# Patient Record
Sex: MALE | Race: OTHER | Employment: FULL TIME | URBAN - METROPOLITAN AREA
[De-identification: names, ages, dates, MRNs, and addresses within clinical notes are randomized per-mention and may not be internally consistent; named-entity substitution may affect disease eponyms.]

---

## 2019-11-02 ENCOUNTER — LAB ENCOUNTER (OUTPATIENT)
Dept: LAB | Age: 54
End: 2019-11-02
Attending: ORTHOPAEDIC SURGERY
Payer: COMMERCIAL

## 2019-11-02 DIAGNOSIS — M17.11 PRIMARY OSTEOARTHRITIS OF RIGHT KNEE: ICD-10-CM

## 2019-11-02 PROCEDURE — 86901 BLOOD TYPING SEROLOGIC RH(D): CPT

## 2019-11-02 PROCEDURE — 86900 BLOOD TYPING SEROLOGIC ABO: CPT

## 2019-11-02 PROCEDURE — 86850 RBC ANTIBODY SCREEN: CPT

## 2019-11-02 PROCEDURE — 87081 CULTURE SCREEN ONLY: CPT

## 2019-11-02 PROCEDURE — 36415 COLL VENOUS BLD VENIPUNCTURE: CPT

## 2019-11-08 ENCOUNTER — LAB ENCOUNTER (OUTPATIENT)
Dept: LAB | Facility: HOSPITAL | Age: 54
End: 2019-11-08
Attending: ORTHOPAEDIC SURGERY
Payer: COMMERCIAL

## 2019-11-08 DIAGNOSIS — M17.11 PRIMARY OSTEOARTHRITIS OF RIGHT KNEE: ICD-10-CM

## 2019-11-08 PROCEDURE — 36415 COLL VENOUS BLD VENIPUNCTURE: CPT

## 2019-11-08 PROCEDURE — 86850 RBC ANTIBODY SCREEN: CPT

## 2019-11-08 PROCEDURE — 86901 BLOOD TYPING SEROLOGIC RH(D): CPT

## 2019-11-08 PROCEDURE — 86900 BLOOD TYPING SEROLOGIC ABO: CPT

## 2019-11-25 ENCOUNTER — ANESTHESIA EVENT (OUTPATIENT)
Dept: SURGERY | Facility: HOSPITAL | Age: 54
DRG: 470 | End: 2019-11-25
Payer: COMMERCIAL

## 2019-11-25 ENCOUNTER — ANESTHESIA (OUTPATIENT)
Dept: SURGERY | Facility: HOSPITAL | Age: 54
DRG: 470 | End: 2019-11-25
Payer: COMMERCIAL

## 2019-11-25 ENCOUNTER — APPOINTMENT (OUTPATIENT)
Dept: GENERAL RADIOLOGY | Facility: HOSPITAL | Age: 54
DRG: 470 | End: 2019-11-25
Attending: ORTHOPAEDIC SURGERY
Payer: COMMERCIAL

## 2019-11-25 ENCOUNTER — HOSPITAL ENCOUNTER (INPATIENT)
Facility: HOSPITAL | Age: 54
LOS: 1 days | Discharge: HOME HEALTH | DRG: 470 | End: 2019-11-26
Attending: ORTHOPAEDIC SURGERY | Admitting: ORTHOPAEDIC SURGERY
Payer: COMMERCIAL

## 2019-11-25 DIAGNOSIS — M17.11 PRIMARY OSTEOARTHRITIS OF RIGHT KNEE: Primary | ICD-10-CM

## 2019-11-25 PROCEDURE — 73560 X-RAY EXAM OF KNEE 1 OR 2: CPT | Performed by: ORTHOPAEDIC SURGERY

## 2019-11-25 PROCEDURE — 3E0T3BZ INTRODUCTION OF ANESTHETIC AGENT INTO PERIPHERAL NERVES AND PLEXI, PERCUTANEOUS APPROACH: ICD-10-PCS | Performed by: ANESTHESIOLOGY

## 2019-11-25 PROCEDURE — 76942 ECHO GUIDE FOR BIOPSY: CPT | Performed by: ORTHOPAEDIC SURGERY

## 2019-11-25 PROCEDURE — 88311 DECALCIFY TISSUE: CPT | Performed by: ORTHOPAEDIC SURGERY

## 2019-11-25 PROCEDURE — 88305 TISSUE EXAM BY PATHOLOGIST: CPT | Performed by: ORTHOPAEDIC SURGERY

## 2019-11-25 PROCEDURE — 0SRC0J9 REPLACEMENT OF RIGHT KNEE JOINT WITH SYNTHETIC SUBSTITUTE, CEMENTED, OPEN APPROACH: ICD-10-PCS | Performed by: ORTHOPAEDIC SURGERY

## 2019-11-25 DEVICE — ATTUNE KNEE SYSTEM FEMORAL POSTERIOR STABILIZED SIZE 6 RIGHT CEMENTED
Type: IMPLANTABLE DEVICE | Site: KNEE | Status: FUNCTIONAL
Brand: ATTUNE

## 2019-11-25 DEVICE — ATTUNE PATELLA MEDIALIZED ANATOMIC 38MM CEMENTED AOX
Type: IMPLANTABLE DEVICE | Site: KNEE | Status: FUNCTIONAL
Brand: ATTUNE

## 2019-11-25 DEVICE — CEMENT BONE RADIOPAQ HOWMEDICA: Type: IMPLANTABLE DEVICE | Site: KNEE | Status: FUNCTIONAL

## 2019-11-25 DEVICE — ATTUNE KNEE SYSTEM TIBIAL BASE ROTATING PLATFORM SIZE 5 CEMENTED
Type: IMPLANTABLE DEVICE | Site: KNEE | Status: FUNCTIONAL
Brand: ATTUNE

## 2019-11-25 DEVICE — ATTUNE KNEE SYSTEM TIBIAL INSERT ROTATING PLATFORM POSTERIOR STABILIZED 6 6MM AOX
Type: IMPLANTABLE DEVICE | Site: KNEE | Status: FUNCTIONAL
Brand: ATTUNE

## 2019-11-25 RX ORDER — BISACODYL 10 MG
10 SUPPOSITORY, RECTAL RECTAL
Status: DISCONTINUED | OUTPATIENT
Start: 2019-11-25 | End: 2019-11-26

## 2019-11-25 RX ORDER — CEFAZOLIN SODIUM/WATER 2 G/20 ML
2 SYRINGE (ML) INTRAVENOUS EVERY 8 HOURS
Status: COMPLETED | OUTPATIENT
Start: 2019-11-25 | End: 2019-11-26

## 2019-11-25 RX ORDER — HYDROMORPHONE HYDROCHLORIDE 1 MG/ML
0.4 INJECTION, SOLUTION INTRAMUSCULAR; INTRAVENOUS; SUBCUTANEOUS EVERY 2 HOUR PRN
Status: DISCONTINUED | OUTPATIENT
Start: 2019-11-25 | End: 2019-11-26

## 2019-11-25 RX ORDER — SODIUM CHLORIDE 9 MG/ML
INJECTION, SOLUTION INTRAVENOUS CONTINUOUS
Status: DISCONTINUED | OUTPATIENT
Start: 2019-11-25 | End: 2019-11-26

## 2019-11-25 RX ORDER — DIPHENHYDRAMINE HYDROCHLORIDE 50 MG/ML
25 INJECTION INTRAMUSCULAR; INTRAVENOUS ONCE AS NEEDED
Status: ACTIVE | OUTPATIENT
Start: 2019-11-25 | End: 2019-11-25

## 2019-11-25 RX ORDER — OXYCODONE HYDROCHLORIDE 15 MG/1
15 TABLET ORAL EVERY 4 HOURS PRN
Status: DISCONTINUED | OUTPATIENT
Start: 2019-11-25 | End: 2019-11-26

## 2019-11-25 RX ORDER — ACETAMINOPHEN 325 MG/1
650 TABLET ORAL ONCE
Status: COMPLETED | OUTPATIENT
Start: 2019-11-25 | End: 2019-11-25

## 2019-11-25 RX ORDER — MELATONIN
325
Status: DISCONTINUED | OUTPATIENT
Start: 2019-11-26 | End: 2019-11-26

## 2019-11-25 RX ORDER — ONDANSETRON 2 MG/ML
4 INJECTION INTRAMUSCULAR; INTRAVENOUS EVERY 4 HOURS PRN
Status: DISCONTINUED | OUTPATIENT
Start: 2019-11-25 | End: 2019-11-26

## 2019-11-25 RX ORDER — SENNOSIDES 8.6 MG
17.2 TABLET ORAL NIGHTLY
Status: DISCONTINUED | OUTPATIENT
Start: 2019-11-25 | End: 2019-11-26

## 2019-11-25 RX ORDER — KETOROLAC TROMETHAMINE 30 MG/ML
30 INJECTION, SOLUTION INTRAMUSCULAR; INTRAVENOUS EVERY 6 HOURS
Status: COMPLETED | OUTPATIENT
Start: 2019-11-25 | End: 2019-11-26

## 2019-11-25 RX ORDER — POLYETHYLENE GLYCOL 3350 17 G/17G
17 POWDER, FOR SOLUTION ORAL DAILY PRN
Status: DISCONTINUED | OUTPATIENT
Start: 2019-11-25 | End: 2019-11-26

## 2019-11-25 RX ORDER — OXYCODONE HYDROCHLORIDE 5 MG/1
5 TABLET ORAL EVERY 4 HOURS PRN
Status: DISCONTINUED | OUTPATIENT
Start: 2019-11-25 | End: 2019-11-26

## 2019-11-25 RX ORDER — DIPHENHYDRAMINE HCL 25 MG
25 CAPSULE ORAL EVERY 4 HOURS PRN
Status: DISCONTINUED | OUTPATIENT
Start: 2019-11-25 | End: 2019-11-26

## 2019-11-25 RX ORDER — MEPERIDINE HYDROCHLORIDE 25 MG/ML
12.5 INJECTION INTRAMUSCULAR; INTRAVENOUS; SUBCUTANEOUS AS NEEDED
Status: DISCONTINUED | OUTPATIENT
Start: 2019-11-25 | End: 2019-11-25 | Stop reason: HOSPADM

## 2019-11-25 RX ORDER — ACETAMINOPHEN 325 MG/1
650 TABLET ORAL 4 TIMES DAILY
Status: DISCONTINUED | OUTPATIENT
Start: 2019-11-25 | End: 2019-11-26

## 2019-11-25 RX ORDER — ACETAMINOPHEN 500 MG
1000 TABLET ORAL ONCE
Status: DISCONTINUED | OUTPATIENT
Start: 2019-11-25 | End: 2019-11-25 | Stop reason: HOSPADM

## 2019-11-25 RX ORDER — ACETAMINOPHEN 500 MG
1000 TABLET ORAL 3 TIMES DAILY
Qty: 120 TABLET | Refills: 0 | Status: SHIPPED | OUTPATIENT
Start: 2019-11-25

## 2019-11-25 RX ORDER — SODIUM CHLORIDE, SODIUM LACTATE, POTASSIUM CHLORIDE, CALCIUM CHLORIDE 600; 310; 30; 20 MG/100ML; MG/100ML; MG/100ML; MG/100ML
INJECTION, SOLUTION INTRAVENOUS CONTINUOUS
Status: DISCONTINUED | OUTPATIENT
Start: 2019-11-25 | End: 2019-11-26

## 2019-11-25 RX ORDER — MIDAZOLAM HYDROCHLORIDE 1 MG/ML
INJECTION INTRAMUSCULAR; INTRAVENOUS AS NEEDED
Status: DISCONTINUED | OUTPATIENT
Start: 2019-11-25 | End: 2019-11-25 | Stop reason: SURG

## 2019-11-25 RX ORDER — HYDROMORPHONE HYDROCHLORIDE 1 MG/ML
0.8 INJECTION, SOLUTION INTRAMUSCULAR; INTRAVENOUS; SUBCUTANEOUS EVERY 2 HOUR PRN
Status: DISCONTINUED | OUTPATIENT
Start: 2019-11-25 | End: 2019-11-26

## 2019-11-25 RX ORDER — HYDROMORPHONE HYDROCHLORIDE 1 MG/ML
0.2 INJECTION, SOLUTION INTRAMUSCULAR; INTRAVENOUS; SUBCUTANEOUS EVERY 2 HOUR PRN
Status: DISCONTINUED | OUTPATIENT
Start: 2019-11-25 | End: 2019-11-26

## 2019-11-25 RX ORDER — PROCHLORPERAZINE EDISYLATE 5 MG/ML
10 INJECTION INTRAMUSCULAR; INTRAVENOUS EVERY 6 HOURS PRN
Status: DISCONTINUED | OUTPATIENT
Start: 2019-11-25 | End: 2019-11-26

## 2019-11-25 RX ORDER — BUPIVACAINE HYDROCHLORIDE 7.5 MG/ML
INJECTION, SOLUTION INTRASPINAL AS NEEDED
Status: DISCONTINUED | OUTPATIENT
Start: 2019-11-25 | End: 2019-11-25 | Stop reason: SURG

## 2019-11-25 RX ORDER — DIPHENHYDRAMINE HYDROCHLORIDE 50 MG/ML
12.5 INJECTION INTRAMUSCULAR; INTRAVENOUS EVERY 4 HOURS PRN
Status: DISCONTINUED | OUTPATIENT
Start: 2019-11-25 | End: 2019-11-26

## 2019-11-25 RX ORDER — TRAMADOL HYDROCHLORIDE 50 MG/1
50 TABLET ORAL EVERY 6 HOURS PRN
Qty: 60 TABLET | Refills: 1 | Status: SHIPPED | OUTPATIENT
Start: 2019-11-25

## 2019-11-25 RX ORDER — CELECOXIB 200 MG/1
200 CAPSULE ORAL DAILY
Qty: 30 CAPSULE | Refills: 0 | Status: SHIPPED | OUTPATIENT
Start: 2019-11-25

## 2019-11-25 RX ORDER — SCOLOPAMINE TRANSDERMAL SYSTEM 1 MG/1
1 PATCH, EXTENDED RELEASE TRANSDERMAL ONCE
Status: DISCONTINUED | OUTPATIENT
Start: 2019-11-25 | End: 2019-11-26

## 2019-11-25 RX ORDER — TIZANIDINE 4 MG/1
4 TABLET ORAL 3 TIMES DAILY PRN
Status: DISCONTINUED | OUTPATIENT
Start: 2019-11-25 | End: 2019-11-26

## 2019-11-25 RX ORDER — NALOXONE HYDROCHLORIDE 0.4 MG/ML
80 INJECTION, SOLUTION INTRAMUSCULAR; INTRAVENOUS; SUBCUTANEOUS AS NEEDED
Status: DISCONTINUED | OUTPATIENT
Start: 2019-11-25 | End: 2019-11-25 | Stop reason: HOSPADM

## 2019-11-25 RX ORDER — CEFAZOLIN SODIUM/WATER 2 G/20 ML
2 SYRINGE (ML) INTRAVENOUS ONCE
Status: COMPLETED | OUTPATIENT
Start: 2019-11-25 | End: 2019-11-25

## 2019-11-25 RX ORDER — METOCLOPRAMIDE HYDROCHLORIDE 5 MG/ML
10 INJECTION INTRAMUSCULAR; INTRAVENOUS EVERY 6 HOURS PRN
Status: DISCONTINUED | OUTPATIENT
Start: 2019-11-25 | End: 2019-11-26

## 2019-11-25 RX ORDER — SODIUM PHOSPHATE, DIBASIC AND SODIUM PHOSPHATE, MONOBASIC 7; 19 G/133ML; G/133ML
1 ENEMA RECTAL ONCE AS NEEDED
Status: DISCONTINUED | OUTPATIENT
Start: 2019-11-25 | End: 2019-11-26

## 2019-11-25 RX ORDER — DOCUSATE SODIUM 100 MG/1
100 CAPSULE, LIQUID FILLED ORAL 2 TIMES DAILY
Status: DISCONTINUED | OUTPATIENT
Start: 2019-11-25 | End: 2019-11-26

## 2019-11-25 RX ORDER — SODIUM CHLORIDE, SODIUM LACTATE, POTASSIUM CHLORIDE, CALCIUM CHLORIDE 600; 310; 30; 20 MG/100ML; MG/100ML; MG/100ML; MG/100ML
INJECTION, SOLUTION INTRAVENOUS CONTINUOUS
Status: DISCONTINUED | OUTPATIENT
Start: 2019-11-25 | End: 2019-11-25 | Stop reason: HOSPADM

## 2019-11-25 RX ORDER — ACETAMINOPHEN 325 MG/1
TABLET ORAL
Status: COMPLETED
Start: 2019-11-25 | End: 2019-11-25

## 2019-11-25 RX ORDER — HYDROMORPHONE HYDROCHLORIDE 1 MG/ML
0.4 INJECTION, SOLUTION INTRAMUSCULAR; INTRAVENOUS; SUBCUTANEOUS EVERY 5 MIN PRN
Status: DISCONTINUED | OUTPATIENT
Start: 2019-11-25 | End: 2019-11-25 | Stop reason: HOSPADM

## 2019-11-25 RX ORDER — OXYCODONE HYDROCHLORIDE 10 MG/1
10 TABLET ORAL EVERY 4 HOURS PRN
Status: DISCONTINUED | OUTPATIENT
Start: 2019-11-25 | End: 2019-11-26

## 2019-11-25 RX ORDER — OXYCODONE HYDROCHLORIDE 5 MG/1
5 TABLET ORAL EVERY 6 HOURS PRN
Qty: 40 TABLET | Refills: 0 | Status: SHIPPED | OUTPATIENT
Start: 2019-11-25

## 2019-11-25 RX ORDER — ONDANSETRON 2 MG/ML
4 INJECTION INTRAMUSCULAR; INTRAVENOUS AS NEEDED
Status: DISCONTINUED | OUTPATIENT
Start: 2019-11-25 | End: 2019-11-25 | Stop reason: HOSPADM

## 2019-11-25 RX ADMIN — MIDAZOLAM HYDROCHLORIDE 4 MG: 1 INJECTION INTRAMUSCULAR; INTRAVENOUS at 12:04:00

## 2019-11-25 RX ADMIN — SODIUM CHLORIDE, SODIUM LACTATE, POTASSIUM CHLORIDE, CALCIUM CHLORIDE: 600; 310; 30; 20 INJECTION, SOLUTION INTRAVENOUS at 12:46:00

## 2019-11-25 RX ADMIN — SODIUM CHLORIDE, SODIUM LACTATE, POTASSIUM CHLORIDE, CALCIUM CHLORIDE: 600; 310; 30; 20 INJECTION, SOLUTION INTRAVENOUS at 14:59:00

## 2019-11-25 RX ADMIN — CEFAZOLIN SODIUM/WATER 2 G: 2 G/20 ML SYRINGE (ML) INTRAVENOUS at 12:23:00

## 2019-11-25 RX ADMIN — SODIUM CHLORIDE, SODIUM LACTATE, POTASSIUM CHLORIDE, CALCIUM CHLORIDE: 600; 310; 30; 20 INJECTION, SOLUTION INTRAVENOUS at 13:13:00

## 2019-11-25 RX ADMIN — BUPIVACAINE HYDROCHLORIDE 1.2 ML: 7.5 INJECTION, SOLUTION INTRASPINAL at 12:08:00

## 2019-11-25 NOTE — ANESTHESIA PROCEDURE NOTES
[facility]  Performed by: Leonardo Ferris MD  Authorized by: Leonardo Ferris MD       General Information and Staff    Start Time:  11/25/2019 12:17 PM  End Time:  11/25/2019 12:20 PM  Anesthesiologist:  Leonardo Ferris MD  Performed by:   Anesthesiologist

## 2019-11-25 NOTE — ANESTHESIA PROCEDURE NOTES
[facility]  Performed by: Griselda Dexter, MD  Authorized by: Griselda Dexter, MD       General Information and Staff    Start Time:  11/25/2019 12:05 PM  End Time:  11/25/2019 12:10 PM  Anesthesiologist:  Griselda Dexter, MD  Performed by:   Anesthesiologist

## 2019-11-25 NOTE — ANESTHESIA POSTPROCEDURE EVALUATION
[address] [facility] Patient Status:  Surgery Admit - Inpt   Age/Gender 49year old male MRN [id number]   Location 13[facility] Attending Karen Salazar MD   Hosp Day # 0 PCP [facility]       Anesthesia Post-op

## 2019-11-25 NOTE — PROGRESS NOTES
Jasson Avila   8/23/2019 9:30 AM   Office Visit   MRN:  [id number]   Description: 49year old male Provider: Carol Sheehan MD Department: [facility]   Scanning Cover Sheet     Click to print Barcode Encounter Cover Sheet for scanning   Office Visit Drug use: Not on file         REVIEW OF SYSTEMS:      GENERAL HEALTH: feels well otherwise  SKIN: denies any unusual skin lesions or rashes  RESPIRATORY: denies shortness of breath with exertion  CARDIOVASCULAR: denies chest pain on exertion  GI: denies Discussed degree of his [facility] arthritis. Treatment options reviewed. It does sound like he has been through reasonable conservative care. I do believe he is a candidate for knee replacement surgery. Discussed what that means.   Hospital course, recovery

## 2019-11-25 NOTE — ANESTHESIA PROCEDURE NOTES
[facility]  Performed by: Kumar Beach MD  Authorized by: Kumar Beach MD       General Information and Staff    Start Time:  11/25/2019 1:12 PM  End Time:  11/25/2019 12:17 PM  Anesthesiologist:  Kumar Beach MD  Performed by:   Anesthesiologist

## 2019-11-25 NOTE — ANESTHESIA PREPROCEDURE EVALUATION
PRE-OP EVALUATION    Patient Name: Sigifredo Adame    Pre-op Diagnosis: Primary osteoarthritis of right knee [M17.11]    Procedure(s):  RIGHT TOTAL KNEE REPLACEMENT    Surgeon(s) and Role:     Bonita Gonzalez MD - Primary    Pre-op vitals reviewed. Temp: 98. Never Used    Alcohol use: Yes      Frequency: 2-4 times a month      Drinks per session: 5 or 6      Binge frequency: Less than monthly      Drug use: Unknown     Available pre-op labs reviewed.                Airway      Mallampati: II  Mouth opening: 3 F

## 2019-11-25 NOTE — OPERATIVE REPORT
[address] [facility]    PATIENT'S NAME: Donprem Jac   ATTENDING PHYSICIAN: Madiha Cervantes M.D. OPERATING PHYSICIAN: Madiha Cervantes M.D.    PATIENT ACCOUNT#:   [de-identified]    LOCATION:  PACU [facility] PACU 7 EDWP 10  MEDICAL RECORD #:   [id number]       DATE OF BIRTH:  [birth year] meniscectomies were done. Proximal tibia cut was made in anatomic alignment with the tibia. Then distal femoral cut was made at 6 degrees of valgus angle.   I released the medial side a little further to tension mediolaterally, which appeared to be at [facility] 6 polys. I felt that 6 poly gave the best stability. Knee came out to full extension. Flexion felt stable. Patella tracked well. Patella was then everted. Patella osteotomy was done.   It was a 25 mm patella thickness that went down to 15 mm, and 38 m

## 2019-11-25 NOTE — INTERVAL H&P NOTE
Pre-op Diagnosis: Primary osteoarthritis of right knee [M17.11]    The above referenced H&P was reviewed by Oscar Burgos MD on 11/25/2019, the patient was examined and no significant changes have occurred in the patient's condition since the H&P was perform

## 2019-11-25 NOTE — BRIEF OP NOTE
Pre-Operative Diagnosis: Primary osteoarthritis of right knee [M17.11]     Post-Operative Diagnosis: Primary osteoarthritis of right knee [M17.11]      Procedure Performed:   Procedure(s):  REMOVAL OF RIGHT KNEE STAPLES AND RIGHT TOTAL KNEE REPLACEMENT

## 2019-11-26 VITALS
WEIGHT: 211.63 LBS | RESPIRATION RATE: 18 BRPM | HEART RATE: 71 BPM | BODY MASS INDEX: 32.07 KG/M2 | SYSTOLIC BLOOD PRESSURE: 153 MMHG | OXYGEN SATURATION: 95 % | DIASTOLIC BLOOD PRESSURE: 76 MMHG | TEMPERATURE: 98 F | HEIGHT: 68 IN

## 2019-11-26 PROCEDURE — 97161 PT EVAL LOW COMPLEX 20 MIN: CPT

## 2019-11-26 PROCEDURE — 97116 GAIT TRAINING THERAPY: CPT

## 2019-11-26 PROCEDURE — 80048 BASIC METABOLIC PNL TOTAL CA: CPT | Performed by: HOSPITALIST

## 2019-11-26 PROCEDURE — 85027 COMPLETE CBC AUTOMATED: CPT | Performed by: ORTHOPAEDIC SURGERY

## 2019-11-26 PROCEDURE — 97535 SELF CARE MNGMENT TRAINING: CPT

## 2019-11-26 PROCEDURE — 97165 OT EVAL LOW COMPLEX 30 MIN: CPT

## 2019-11-26 PROCEDURE — 97150 GROUP THERAPEUTIC PROCEDURES: CPT

## 2019-11-26 RX ORDER — POTASSIUM CHLORIDE 20 MEQ/1
40 TABLET, EXTENDED RELEASE ORAL ONCE
Status: COMPLETED | OUTPATIENT
Start: 2019-11-26 | End: 2019-11-26

## 2019-11-26 RX ORDER — PSEUDOEPHEDRINE HCL 30 MG
100 TABLET ORAL 2 TIMES DAILY PRN
Qty: 20 CAPSULE | Refills: 0 | Status: SHIPPED | OUTPATIENT
Start: 2019-11-26

## 2019-11-26 NOTE — PLAN OF CARE
Received pt from pacu at 1600. Dressing intact to right knee. Received Oxy and IV dilaudid for pain with relief. Wife at bedside. Pt and wife verbalized understanding of POC and fall precautions. Will start therapy tomorrow. Plans home with [facility] on dc.

## 2019-11-26 NOTE — PLAN OF CARE
Pain managed on oral pain medications. Denies numbness/tingling to BLE. Ace wrap CDI. Gel ice to site. VSS. SCDS on bilaterally. Fall precautions in place. Plan home with [facility] today if passes therapy. Will monitor.

## 2019-11-26 NOTE — CONSULTS
[facility] hospitalist initial consult note  PCP Noah Rabago at the request of Dr. Jb Shelley  Reason for consult medical co-mangement  HPI 50 yo male with with hx of OA here s/p REMOVAL OF RIGHT KNEE STAPLES AND RIGHT TOTAL KNEE REPLACEMENT  Does have file      Intimate partner violence:        Fear of current or ex partner: Not on file        Emotionally abused: Not on file        Physically abused: Not on file        Forced sexual activity: Not on file    Other Topics      Concerns:        Not on file

## 2019-11-26 NOTE — CM/SW NOTE
PT recs for home with [facility]. Paged [facility] care with referral.  Liaison will meet with the patient/familyto provide choice, explanation of services, and financial disclosure. Orders entered.        HOME SITUATION  Type of Home: [facility]

## 2019-11-26 NOTE — HOME CARE LIAISON
Received referral from BRIONNA/Jb. Met with patient at the bedside and provided choice. Patient is agreeable to [facility], pending orders. Residential brochure provided with contact information. All questions addressed and answered.

## 2019-11-26 NOTE — PHYSICAL THERAPY NOTE
PHYSICAL THERAPY KNEE EVALUATION - INPATIENT     Room Number: 358/358-A  Evaluation Date: 11/26/2019  Type of Evaluation: Initial  Physician Order: PT Eval and Treat    Presenting Problem: s/p right TKA 11/25/19  Reason for Therapy: Mobility Dysfunction an Sitting: Good  Dynamic Sitting: Good  Static Standing: Fair -  Dynamic Standing: Fair -                                                                       ADDITIONAL TESTS                                 ACTIVITY TOLERANCE                         O2 WAL session with RN, discussed assist need with PCT.           Exercise/Education Provided:  Bed mobility  Energy conservation  Functional activity tolerated  Gait training  Posture  Transfer training    Patient End of Session: Up in chair;Needs met;Call light #3    Patient is able to ambulate 150 feet with assistive device at assistance level: modified  independent    Goal #4    Patient will negotiate 4 stairs/one curb w/ assistive device and supervision    Goal #5    AROM 0 degrees extension to 95 degrees flex

## 2019-11-26 NOTE — PROGRESS NOTES
[facility] hospitalist daily note  Patient was seen/examined on 11/26/19    S:  No chest pain, no SOB, no abd pain, no nausea/emesis  + passing gas and urinating  No dizziness    Medications in Epic    PE      11/26/19  0800   BP: 143/85   Pulse: 79   Resp: 18

## 2019-11-26 NOTE — PHYSICAL THERAPY NOTE
PHYSICAL THERAPY KNEE TREATMENT NOTE - INPATIENT     Room Number: 358/358-A     Session: 1&2   Number of Visits to Meet Established Goals: 3    Presenting Problem: s/p right TKA 11/25/19    Problem List  Active Problems:    Primary osteoarthritis of right Scale): 53.28   CMS Modifier (G-Code): CJ    FUNCTIONAL ABILITY STATUS  Gait Assessment   Gait Assistance: Supervision  Distance (ft): 250  Assistive Device: Rolling walker  Pattern: R Decreased stance time  Stoop/Curb Assistance: Supervision(4 stairs 1 HR below PLOF s/p R TKA  . Pt will continue to benefit from ongoing IP PT to maximize functional independence.   The AM-PAC '6-Clicks' Inpatient Basic Mobility Short Form was completed and this patient is demonstrating a 20.91% degree of impairment in mobilit

## 2019-11-26 NOTE — PROGRESS NOTES
Orthopedic surgery progress note    Sigifredo Adame Patient Status:  Inpatient    1965 MRN [id number]   [facility] 3SW-A Attending Angela Guajardo MD   Hosp Day # 1 PCP KARELY CORDOVA       Subjective:  No major complaints.   No calf pain,

## 2019-11-26 NOTE — PLAN OF CARE
POD#0, A/Ox4, , IS encouraged, SCD's, ace wrap and Aquacel to incision, up as tolerated, voiding to bathroom without difficulty, pain medication administered, regular diet, gel pack to right knee, saline locked, plan to D/C with SANJAY BATISTA 11/25/19zoran

## 2019-11-26 NOTE — OCCUPATIONAL THERAPY NOTE
OCCUPATIONAL THERAPY QUICK EVALUATION - INPATIENT    Room Number: 358/358-A  Evaluation Date: 11/26/2019     Type of Evaluation: Quick Eval  Presenting Problem: (R TKA)    Physician Order: IP Consult to Occupational Therapy  Reason for Therapy:  ADL/IADL D Daily Activity Short Form   How much help from another person does the patient currently need…  -   Putting on and taking off regular lower body clothing?: A Little   -   Bathing (including washing, rinsing, drying)?: A Little  -   Toileting, which include following performance deficits: decreased balance, R knee pain. These deficits impact the patient’s ability to participate in ADLs, instrumental activities of daily living, rest and sleep, work, leisure and social participation.    The AM-JOSE MIGUEL ' '6-Clicks' I

## 2019-11-27 NOTE — CM/SW NOTE
11/27/19 1300   Discharge disposition   Expected discharge disposition Home-Health   Name of Facillity/Home Care/Hospice Residential   Discharge transportation Private car   DC 11/26/19

## 2019-12-04 NOTE — PAYOR COMM NOTE
--------------  DISCHARGE REVIEW    Payor: [address] #:  [id number]  Authorization Number: [id number]    Admit date: 11/25/19  Admit time:  1600  Discharge Date: 11/26/2019  4:55 PM     Admitting Physician: Tristen Roman

## (undated) DEVICE — Device: Brand: STABLECUT®

## (undated) DEVICE — BOWL CEMENT MIX QUICK-VAC

## (undated) DEVICE — WRAP COOLING KNEE W/ICE PILLOW

## (undated) DEVICE — SUTURE VICRYL 2-0 CP-1

## (undated) DEVICE — ZIMMER® STERILE DISPOSABLE TOURNIQUET CUFF WITH PLC, DUAL PORT, SINGLE BLADDER, 34 IN. (86 CM)

## (undated) DEVICE — SOL  .9 1000ML BTL

## (undated) DEVICE — HOOD, PEEL-AWAY: Brand: FLYTE

## (undated) DEVICE — STERILE POLYISOPRENE POWDER-FREE SURGICAL GLOVES: Brand: PROTEXIS

## (undated) DEVICE — DRESSING AQUACEL AG 3.5X12

## (undated) DEVICE — UNIVERSAL STERIBUMP® STERILE (5/CASE): Brand: UNIVERSAL STERIBUMP®

## (undated) DEVICE — 4.0MM OVAL CARBIDE BUR

## (undated) DEVICE — GOWN SURG AERO CHROME XXL

## (undated) DEVICE — DECANTER BAG 9": Brand: MEDLINE INDUSTRIES, INC.

## (undated) DEVICE — CONVERTORS STOCKINETTE: Brand: CONVERTORS

## (undated) DEVICE — TOTAL KNEE CDS: Brand: MEDLINE INDUSTRIES, INC.

## (undated) DEVICE — Device: Brand: PLUMEPEN

## (undated) DEVICE — DRAPE C-ARM UNIVERSAL

## (undated) DEVICE — BANDAGE ELASTIC ACE 6\" X-LONG

## (undated) DEVICE — 3.2MM X 18.3MM METAL CUTTING HELIOCOIDAL RASP

## (undated) DEVICE — 3M™ IOBAN™ 2 ANTIMICROBIAL INCISE DRAPE 6651EZ: Brand: IOBAN™ 2

## (undated) DEVICE — ATTUNE PINNING SYSTEM: Brand: ATTUNE

## (undated) DEVICE — SPECIMEN CONTAINER,POSITIVE SEAL INDICATOR, OR PACKAGED: Brand: PRECISION

## (undated) DEVICE — Device

## (undated) DEVICE — 3M™ MICROFOAM™ TAPE 1528-4: Brand: 3M™ MICROFOAM™

## (undated) DEVICE — CHLORAPREP 26ML APPLICATOR

## (undated) DEVICE — KENDALL SCD EXPRESS SLEEVES, KNEE LENGTH, MEDIUM: Brand: KENDALL SCD

## (undated) DEVICE — PADDING CAST COTTON  4

## (undated) DEVICE — SUTURE STRATAFIX PDS PLUS 45CM

## (undated) DEVICE — DRAPE,U/SHT,SPLIT,FILM,60X84,STERILE: Brand: MEDLINE

## (undated) NOTE — IP AVS SNAPSHOT
Patient Demographics     Address  [address] [place] [id number] Phone  [phone] [facility])  [phone] (Mobile) *Preferred* E-mail Address  [email or web address]      Emergency Contact(s)     Name Relation Home Work Mobile    Raffaele, [address] [place] [address] Activity    Bathing  ? No tub baths, pools, or saunas until cleared by surgeon (about 4-6 weeks because it takes that long for the incision on the skin to heal and be a barrier to prevent infection). ?  When allowed to shower:    o AQUACEL dressing is wa surgery). Continue this until you follow up in the office with your surgeon. Have knee bent when changing dressing for more ease of bending afterwards. ? There could be a small amount of redness around the staples or incision; this is normal.  ?  Watch for ? As you have less discomfort, decrease the amount of pain medication you take. Use plain Tylenol (acetaminophen) for less severe pain. ? Some pain medications have Tylenol (acetaminophen) in them such as Norco and Percocet.  Do NOT take Tylenol (acetamino replacement. Speak with your physician about this at your post-op office visit. ? Eat a balanced diet high in fiber and drink plenty of fluids. ?  Continue using incentive spirometry because narcotics make you sleepy so you may not take good deep breaths ? If traveling by car, get out to stretch every 2 hours. This helps prevent stiffness. You may need to do this any time you travel for the first year after surgery.   ? If traveling by plane, BEFORE you get into a security line, let them know that you had ? Use the longer tube (tubigrip/compression sleeve) pulled up over the calf and fold it back on itself creating a double layer. ?  This 1st piece (tubigrip/compression sleeve) should be on the calf part of the leg, from just below the knee to the ball of Specialty:  Family Practice  Why:  PCP follow-up  Contact information:  [address] Jennifer Kelley MD. Schedule an appointment as soon as possible for a visit in 2 weeks.     Specialty:  SURGERY, ORTHOPEDIC  Con Please  your prescriptions at the location directed by your doctor or nurse    Bring a paper prescription for each of these medications  acetaminophen 500 MG Tabs  docusate sodium 100 MG Caps  oxyCODONE HCl 5 MG Tabs  traMADol HCl 50 MG Tabs [id number] docusate sodium (COLACE) cap 100 mg 11/26/19 0820 Given      [id number] ferrous sulfate EC tab 325 mg 11/26/19 0821 Given      [id number] ketorolac tromethamine (TORADOL) 30 MG/ML injection 30 mg 11/25/19 1854 Given      [id number] ketorolac tromet Ordering provider:  Marlys Swanson MD  11/25/19 7320 Resulting lab:  [facility]    Specimen Information    Type Source Collected On   Blood — 11/26/19 0450          Components    Component Value Reference Range Flag Lab   WBC 8.4 4.0 - 11.0 x10(3) uL — Len 2 ARTHROSCOPES    • ARTHROTOMY/EXPLORE/TREAT KNEE JOINT Right    • COLONOSCOPY       History reviewed. No pertinent family history.   Social History    Socioeconomic History      Marital status:       Spouse name: Not on file      Number of children 1930   BP: (!) 162/87   Pulse: 52   Resp: 18   Temp: 98.3 °F (36.8 °C)[ID.2]     Gen: awake, alert, no respiratory distress  HEENT; mmm, anicteric, EOMI, PERRL  Neck no JVd  Lymph no tender cervical LAD  CV: RRR, nl S1/S2  Pulm: CTA b/l  Abd; soft, not ten Procedure Laterality Date   • ARTHROSCOPY OF JOINT UNLISTED Right     2 ARTHROSCOPES    • ARTHROTOMY/EXPLORE/TREAT KNEE JOINT Right    • COLONOSCOPY[CY. 3]         SUBJECTIVE[CY. 1]  \"I'm incapacitated\" (when spouse says he can help bring up the elizabeth Skilled Therapy Provided:[CY.1] AM: Pt was wheeled to rehab gym and participated in seated and standing therex per TKA protocol. Pt required min A for set up and cues for technique and body mechanics.   Pt was gait trained 225 feet c RW and supervision dante Pt continues to present with impaired strength and decreased ROM RLE , decreased endurance and impaired balance below PLOF s/p R TKA  . Pt will continue to benefit from ongoing IP PT to maximize functional independence.   The AM-PAC '6-Clicks' Inpatient Ba Room Number: 358/358-A  Evaluation Date: 11/26/2019  Type of Evaluation: Initial  Physician Order: PT Eval and Treat    Presenting Problem: s/p right TKA 11/25/19  Reason for Therapy: Mobility Dysfunction and Discharge Planning    History related to [place] Static Standing: Fair -  Dynamic Standing: Fair -                                                                       ADDITIONAL TESTS                                 ACTIVITY TOLERANCE                         O2 WALK                  AM-PAC '6-Clicks' I discussed assist need with PCT.           Exercise/Education Provided:  Bed mobility  Energy conservation  Functional activity tolerated  Gait training  Posture  Transfer training    Patient End of Session: Up in chair;Needs met;Call light within reach;RN a Patient is able to ambulate 150 feet with assistive device at assistance level: modified  independent    Goal #4    Patient will negotiate 4 stairs/one curb w/ assistive device and supervision    Goal #5    AROM 0 degrees extension to 95 degrees flexion Prior Level of Function:   Independent, lives with spouse and 7 y/o child in a one level home. Works a desk job.      SUBJECTIVE   pleasant, eager to get moving    Patient self-stated goal is to go home today if possible    OBJECTIVE  Precautions: None  Fa reacher used for underwear. Patient completed functional mobility using RW for approx 100 feet in hallway with good tolerance. Toileting and standing hand hygiene and oral care completed with mod I.   Patient was educated to have supervision for initial s OT Discharge Recommendations: Home(assist during recovery)  OT Device Recommendations: None    PLAN   Patient has been evaluated and presents with no skilled Occupational Therapy needs at this time. Patient discharged from Occupational Therapy services.

## (undated) NOTE — LETTER
[facility] Testing Department  Phone: [phone]  OUTSIDE TESTING RESULT REQUEST      TO:   DR Michelle Harris     Today's Date: 10/31/19  FAX #: [phone]     IMPORTANT: FOR YOUR IMMEDIATE ATTENTION  Please FAX all test results listed below to: